# Patient Record
Sex: MALE | NOT HISPANIC OR LATINO | Employment: FULL TIME | URBAN - METROPOLITAN AREA
[De-identification: names, ages, dates, MRNs, and addresses within clinical notes are randomized per-mention and may not be internally consistent; named-entity substitution may affect disease eponyms.]

---

## 2020-12-11 ENCOUNTER — NURSE TRIAGE (OUTPATIENT)
Dept: OTHER | Facility: OTHER | Age: 30
End: 2020-12-11

## 2020-12-11 DIAGNOSIS — Z20.822 SUSPECTED COVID-19 VIRUS INFECTION: ICD-10-CM

## 2020-12-11 DIAGNOSIS — Z20.822 SUSPECTED COVID-19 VIRUS INFECTION: Primary | ICD-10-CM

## 2020-12-11 PROCEDURE — U0003 INFECTIOUS AGENT DETECTION BY NUCLEIC ACID (DNA OR RNA); SEVERE ACUTE RESPIRATORY SYNDROME CORONAVIRUS 2 (SARS-COV-2) (CORONAVIRUS DISEASE [COVID-19]), AMPLIFIED PROBE TECHNIQUE, MAKING USE OF HIGH THROUGHPUT TECHNOLOGIES AS DESCRIBED BY CMS-2020-01-R: HCPCS | Performed by: FAMILY MEDICINE

## 2020-12-12 ENCOUNTER — TELEPHONE (OUTPATIENT)
Dept: GASTROENTEROLOGY | Facility: CLINIC | Age: 30
End: 2020-12-12

## 2020-12-12 LAB — SARS-COV-2 RNA SPEC QL NAA+PROBE: DETECTED

## 2023-08-15 ENCOUNTER — OFFICE VISIT (OUTPATIENT)
Dept: URGENT CARE | Facility: CLINIC | Age: 33
End: 2023-08-15
Payer: COMMERCIAL

## 2023-08-15 VITALS
SYSTOLIC BLOOD PRESSURE: 121 MMHG | HEART RATE: 80 BPM | DIASTOLIC BLOOD PRESSURE: 74 MMHG | OXYGEN SATURATION: 97 % | HEIGHT: 74 IN | RESPIRATION RATE: 14 BRPM | BODY MASS INDEX: 29.52 KG/M2 | TEMPERATURE: 98.8 F | WEIGHT: 230 LBS

## 2023-08-15 DIAGNOSIS — Z78.9 PRESENCE OF SURGICAL INCISION: Primary | ICD-10-CM

## 2023-08-15 PROCEDURE — 99213 OFFICE O/P EST LOW 20 MIN: CPT

## 2023-08-15 NOTE — PROGRESS NOTES
[facility]        NAME: Carole Haynes is a 28 y.o. male  : 1990    MRN: [id number]  DATE: August 15, 2023  TIME: 5:46 PM    Assessment and Plan   Presence of surgical incision [Z78.9]  1. Presence of surgical incision          Jennifer Quach RN present as chaperone for examination. No sign of active skin infection or abscess formation from surgical incision. Symptoms could be related to irritation from grooming practices and advised to proceed with caution. Encouraged continued supportive measures and to monitor for signs of infection. Follow up with Surgeon as needed. Follow up with PCP in 3-5 days or proceed to emergency department for worsening symptoms. Patient verbalized understanding of instructions given. Patient Instructions     Patient Instructions       No sign of infection today  Monitor for signs of infection, including redness, swelling, purulent drainage, or fever/chills  Caution with trimming area   Follow up with PCP in 3-5 days. Proceed to  ER if symptoms worsen. Cellulitis   AMBULATORY CARE:   Cellulitis  is a skin infection caused by bacteria. Cellulitis is common and can become severe. Cellulitis usually appears on the lower legs. It can also appear on the arms, face, and other areas. Cellulitis develops when bacteria enter a crack or break in your skin, such as a scratch, bite, or cut. Common signs and symptoms:  Signs and symptoms usually appear on one side of your body. You may have any of the following:  • A fever    • A red, warm, swollen area on your skin    • Pain when the area is touched    • Red spots, bumps, or blisters    • Bumpy, raised skin that feels like an orange peel    Seek care immediately if:   • Your wound gets larger and more painful. • You feel a crackling under your skin when you touch it. • You have purple dots or bumps on your skin, or you see bleeding under your skin.     • You see red streaks coming from the infected area.    Call your doctor if:   • The red, warm, swollen area gets larger. • Your fever or pain does not go away or gets worse. • The area does not get smaller after 3 days of antibiotics. • You have questions or concerns about your condition or care. Treatment:  You should start to see improvement in 3 days. If your cellulitis is severe, you may need IV antibiotics in the hospital. If cellulitis is not treated, the infection can spread through your body and become life-threatening. You may need any of the following medicines:  • Antibiotics  help treat a bacterial infection. • Acetaminophen  decreases pain and fever. It is available without a doctor's order. Ask how much to take and how often to take it. Follow directions. Read the labels of all other medicines you are using to see if they also contain acetaminophen, or ask your doctor or pharmacist. Acetaminophen can cause liver damage if not taken correctly. • NSAIDs , such as ibuprofen, help decrease swelling, pain, and fever. This medicine is available with or without a doctor's order. NSAIDs can cause stomach bleeding or kidney problems in certain people. If you take blood thinner medicine, always ask your healthcare provider if NSAIDs are safe for you. Always read the medicine label and follow directions. • Take your medicine as directed. Contact your healthcare provider if you think your medicine is not helping or if you have side effects. Tell your provider if you are allergic to any medicine. Keep a list of the medicines, vitamins, and herbs you take. Include the amounts, and when and why you take them. Bring the list or the pill bottles to follow-up visits. Carry your medicine list with you in case of an emergency. Self-care:   • Wash the area with soap and water every day. Gently pat dry. Use bandages if directed by your healthcare provider. • Apply cream or ointment as directed. These help protect the area.  Most over-the-counter products, such as petroleum jelly, are good to use. Ask your healthcare provider about specific creams or ointments you should use. • Place a cool, damp cloth on the area. Use clean cloths and clean water. You can do this as often as you need to. Cool, damp cloths may help decrease pain. • Elevate the area above the level of your heart  as often as you can. This will help decrease swelling and pain. Prop the area on pillows or blankets to keep it elevated comfortably. Prevent cellulitis:   • Do not scratch bug bites or areas of injury. You increase your risk for cellulitis by scratching these areas. • Do not share personal items, such as towels, clothing, and razors. • Clean exercise equipment  with germ-killing  before and after you use it. • Treat athlete's foot. This can help prevent the spread of a bacterial skin infection. • Wash your hands often. Use soap and water. Wash your hands after you use the bathroom, change a child's diapers, or sneeze. Wash your hands before you prepare or eat food. Use lotion to prevent dry, cracked skin. Follow up with your doctor within 3 days, or as directed:  He or she will check if your cellulitis is getting better. Write down your questions so you remember to ask them during your visits. © Copyright [employer] 2022 Information is for End User's use only and may not be sold, redistributed or otherwise used for commercial purposes. The above information is an  only. It is not intended as medical advice for individual conditions or treatments. Talk to your doctor, nurse or pharmacist before following any medical regimen to see if it is safe and effective for you.           Chief Complaint     Chief Complaint   Patient presents with   • surgery     Variocele surgery on June 21, 2023; stitch did not fall out of site on left groin; slightly irritated (red and swollen) starting 2 weeks ago, and having some discharge starting last          History of Present Illness       70-year-old male with a past medical history significant for varicocele status postsurgical correction from left groin in June 2023 presents for complaints of wound check. Patient reports noticing some redness and swelling from groin surgical incision x2 weeks however last night with clear drainage. Denies pain. He reports no post-surgical complications and cleared from follow-up after 2-week appointment. He states some sutures remained intact and advised that they would self dissolve. He denies any fever, chills, flu-like symptoms, testicular pain, penile pain, or urinary symptoms. Admits to grooming and trimming pubic region. Review of Systems   Review of Systems   Constitutional: Negative for chills and fever. HENT: Negative for congestion, ear discharge, ear pain, rhinorrhea, sore throat, trouble swallowing and voice change. Eyes: Negative for discharge. Respiratory: Negative for cough and shortness of breath. Cardiovascular: Negative for chest pain. Gastrointestinal: Negative for abdominal pain, diarrhea, nausea and vomiting. Genitourinary: Negative for difficulty urinating, penile pain, penile swelling, scrotal swelling and testicular pain. Musculoskeletal: Negative for arthralgias and myalgias. Skin: Positive for color change. Current Medications     No current outpatient medications on file. Current Allergies     Allergies as of 08/15/2023   • (No Known Allergies)            The following portions of the patient's history were reviewed and updated as appropriate: allergies, current medications, past family history, past medical history, past social history, past surgical history and problem list.     History reviewed. No pertinent past medical history. History reviewed. No pertinent surgical history. History reviewed. No pertinent family history. Medications have been verified.         Objective /74   Pulse 80   Temp 98.8 °F (37.1 °C)   Resp 14   Ht 6' 2" (1.88 m)   Wt 104 kg (230 lb)   SpO2 97%   BMI 29.53 kg/m²   No LMP for male patient. Physical Exam     Physical Exam  Vitals and nursing note reviewed. Exam conducted with a chaperone present. Constitutional:       General: He is not in acute distress. Appearance: He is not toxic-appearing. HENT:      Head: Normocephalic. Nose: Nose normal.      Mouth/Throat:      Mouth: Mucous membranes are moist.      Pharynx: Oropharynx is clear. Eyes:      Conjunctiva/sclera: Conjunctivae normal.   Pulmonary:      Effort: Pulmonary effort is normal.   Genitourinary:      Skin:     General: Skin is warm and dry. Neurological:      Mental Status: He is alert and oriented to person, place, and time. Gait: Gait is intact.    Psychiatric:         Mood and Affect: Mood normal.         Behavior: Behavior normal.

## 2023-08-15 NOTE — PATIENT INSTRUCTIONS
No sign of infection today  Monitor for signs of infection, including redness, swelling, purulent drainage, or fever/chills  Caution with trimming area   Follow up with PCP in 3-5 days. Proceed to  ER if symptoms worsen. Cellulitis   AMBULATORY CARE:   Cellulitis  is a skin infection caused by bacteria. Cellulitis is common and can become severe. Cellulitis usually appears on the lower legs. It can also appear on the arms, face, and other areas. Cellulitis develops when bacteria enter a crack or break in your skin, such as a scratch, bite, or cut. Common signs and symptoms:  Signs and symptoms usually appear on one side of your body. You may have any of the following:  A fever    A red, warm, swollen area on your skin    Pain when the area is touched    Red spots, bumps, or blisters    Bumpy, raised skin that feels like an orange peel    Seek care immediately if:   Your wound gets larger and more painful. You feel a crackling under your skin when you touch it. You have purple dots or bumps on your skin, or you see bleeding under your skin. You see red streaks coming from the infected area. Call your doctor if:   The red, warm, swollen area gets larger. Your fever or pain does not go away or gets worse. The area does not get smaller after 3 days of antibiotics. You have questions or concerns about your condition or care. Treatment:  You should start to see improvement in 3 days. If your cellulitis is severe, you may need IV antibiotics in the hospital. If cellulitis is not treated, the infection can spread through your body and become life-threatening. You may need any of the following medicines:  Antibiotics  help treat a bacterial infection. Acetaminophen  decreases pain and fever. It is available without a doctor's order. Ask how much to take and how often to take it. Follow directions.  Read the labels of all other medicines you are using to see if they also contain acetaminophen, or ask your doctor or pharmacist. Acetaminophen can cause liver damage if not taken correctly. NSAIDs , such as ibuprofen, help decrease swelling, pain, and fever. This medicine is available with or without a doctor's order. NSAIDs can cause stomach bleeding or kidney problems in certain people. If you take blood thinner medicine, always ask your healthcare provider if NSAIDs are safe for you. Always read the medicine label and follow directions. Take your medicine as directed. Contact your healthcare provider if you think your medicine is not helping or if you have side effects. Tell your provider if you are allergic to any medicine. Keep a list of the medicines, vitamins, and herbs you take. Include the amounts, and when and why you take them. Bring the list or the pill bottles to follow-up visits. Carry your medicine list with you in case of an emergency. Self-care:   Wash the area with soap and water every day. Gently pat dry. Use bandages if directed by your healthcare provider. Apply cream or ointment as directed. These help protect the area. Most over-the-counter products, such as petroleum jelly, are good to use. Ask your healthcare provider about specific creams or ointments you should use. Place a cool, damp cloth on the area. Use clean cloths and clean water. You can do this as often as you need to. Cool, damp cloths may help decrease pain. Elevate the area above the level of your heart  as often as you can. This will help decrease swelling and pain. Prop the area on pillows or blankets to keep it elevated comfortably. Prevent cellulitis:   Do not scratch bug bites or areas of injury. You increase your risk for cellulitis by scratching these areas. Do not share personal items, such as towels, clothing, and razors. Clean exercise equipment  with germ-killing  before and after you use it. Treat athlete's foot.   This can help prevent the spread of a bacterial skin infection. Wash your hands often. Use soap and water. Wash your hands after you use the bathroom, change a child's diapers, or sneeze. Wash your hands before you prepare or eat food. Use lotion to prevent dry, cracked skin. Follow up with your doctor within 3 days, or as directed:  He or she will check if your cellulitis is getting better. Write down your questions so you remember to ask them during your visits. © Copyright [facility] 2022 Information is for End User's use only and may not be sold, redistributed or otherwise used for commercial purposes. The above information is an  only. It is not intended as medical advice for individual conditions or treatments. Talk to your doctor, nurse or pharmacist before following any medical regimen to see if it is safe and effective for you.

## 2023-10-15 ENCOUNTER — OFFICE VISIT (OUTPATIENT)
Dept: URGENT CARE | Facility: CLINIC | Age: 33
End: 2023-10-15
Payer: COMMERCIAL

## 2023-10-15 VITALS
BODY MASS INDEX: 28.23 KG/M2 | WEIGHT: 220 LBS | HEIGHT: 74 IN | DIASTOLIC BLOOD PRESSURE: 74 MMHG | OXYGEN SATURATION: 99 % | SYSTOLIC BLOOD PRESSURE: 130 MMHG | TEMPERATURE: 98 F | HEART RATE: 68 BPM | RESPIRATION RATE: 18 BRPM

## 2023-10-15 DIAGNOSIS — R07.9 CHEST PAIN, UNSPECIFIED TYPE: Primary | ICD-10-CM

## 2023-10-15 PROCEDURE — 99213 OFFICE O/P EST LOW 20 MIN: CPT | Performed by: PHYSICIAN ASSISTANT

## 2023-10-15 PROCEDURE — 93005 ELECTROCARDIOGRAM TRACING: CPT | Performed by: PHYSICIAN ASSISTANT

## 2023-10-15 NOTE — PROGRESS NOTES
[facility]        NAME: Regina Maurice is a 35 y.o. male  : 1990    MRN: [id number]  DATE: October 15, 2023  TIME: 2:57 PM    Assessment and Plan   Chest pain, unspecified type [R07.9]  1. Chest pain, unspecified type  Ambulatory referral to [facility]            Patient Instructions   Trial over-the-counter reflux medications. Monitor symptoms with shortness shortness go to ER if they worsen. Make an appoint with a PCP. Follow up with PCP in 3-5 days. Proceed to  ER if symptoms worsen. 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 1100 was having sporadic pains on/off with some flushing. No dizziness or SOB. Chest pain subsided about 1400. History of Present Illness       Patient is a 40-year-old male with no significant past medical history presents the office complaining of intermittent left-sided chest pain since this morning. Pain is rated 1-2 out of 10 and occurs every few minutes and lasts only a couple seconds described as poking below right chest muscles. Symptoms occur regardless of what he is doing. Reports he occasionally felt like his body got warm but denies episodes of diaphoresis, syncope, presyncope, vision changes, jaw pain, left arm pain, numbness and tingling, palpitations, trouble breathing, nausea, vomiting, abdominal pain, or rashes. At first he thought it might be some reflux which has had in the past but his wife wanted him to be evaluated. Patient admits to chewing tobacco but denies smoking, heavy alcohol use, or illicit drug use. Denies history of DVT/PE, hemoptysis, recent travel or immobilization, surgery, lower extremity swelling or calf pain. Patient states he has not had an episode since arriving to the urgent care approximately 45 minutes ago. Denies family history of sudden death or early cardiac disease.         Review of Systems   Review of Systems   Constitutional:  Negative for chills and fever. HENT:  Negative for congestion and sore throat. Respiratory:  Negative for cough and shortness of breath. Cardiovascular:  Positive for chest pain. Negative for palpitations. Gastrointestinal:  Negative for abdominal pain, diarrhea, nausea and vomiting. Skin:  Negative for rash. Neurological:  Negative for dizziness, light-headedness and headaches. Current Medications     No current outpatient medications on file. Current Allergies     Allergies as of 10/15/2023    (No Known Allergies)            The following portions of the patient's history were reviewed and updated as appropriate: allergies, current medications, past family history, past medical history, past social history, past surgical history and problem list.     History reviewed. No pertinent past medical history. Past Surgical History:   Procedure Laterality Date    VARIOCOCELE REPAIR         Family History   Problem Relation Age of Onset    No Known Problems Mother     No Known Problems Father     Cancer Paternal Grandmother          Medications have been verified. Objective   /74   Pulse 68   Temp 98 °F (36.7 °C)   Resp 18   Ht 6' 2" (1.88 m)   Wt 99.8 kg (220 lb)   SpO2 99%   BMI 28.25 kg/m²   No LMP for male patient. Physical Exam     Physical Exam  Vitals and nursing note reviewed. Constitutional:       Appearance: Normal appearance. He is well-developed. HENT:      Head: Normocephalic and atraumatic. Right Ear: Tympanic membrane, ear canal and external ear normal.      Left Ear: Tympanic membrane, ear canal and external ear normal.      Nose: Nose normal.      Mouth/Throat:      Pharynx: Uvula midline. Eyes:      General: Lids are normal.      Conjunctiva/sclera: Conjunctivae normal.      Pupils: Pupils are equal, round, and reactive to light.    Cardiovascular:      Rate and Rhythm: Normal rate and regular rhythm. Heart sounds: Normal heart sounds. No murmur heard. No friction rub. No gallop. Pulmonary:      Effort: Pulmonary effort is normal.      Breath sounds: Normal breath sounds. No stridor. No wheezing or rales. Abdominal:      General: Bowel sounds are normal.      Palpations: Abdomen is soft. Tenderness: There is no abdominal tenderness. Musculoskeletal:         General: Normal range of motion. Cervical back: Neck supple. Skin:     General: Skin is warm and dry. Capillary Refill: Capillary refill takes less than 2 seconds. Neurological:      Mental Status: He is alert. EKG: NSR. No SHERRY or T wave depression.

## 2023-10-16 LAB
ATRIAL RATE: 68 BPM
P AXIS: 52 DEGREES
PR INTERVAL: 148 MS
QRS AXIS: 72 DEGREES
QRSD INTERVAL: 108 MS
QT INTERVAL: 400 MS
QTC INTERVAL: 425 MS
T WAVE AXIS: 54 DEGREES
VENTRICULAR RATE: 68 BPM

## 2023-10-16 PROCEDURE — 93010 ELECTROCARDIOGRAM REPORT: CPT | Performed by: INTERNAL MEDICINE

## 2024-01-04 ENCOUNTER — OFFICE VISIT (OUTPATIENT)
Dept: FAMILY MEDICINE CLINIC | Facility: CLINIC | Age: 34
End: 2024-01-04
Payer: COMMERCIAL

## 2024-01-04 VITALS
HEART RATE: 68 BPM | SYSTOLIC BLOOD PRESSURE: 128 MMHG | WEIGHT: 225.8 LBS | DIASTOLIC BLOOD PRESSURE: 70 MMHG | BODY MASS INDEX: 28.98 KG/M2 | HEIGHT: 74 IN | OXYGEN SATURATION: 100 %

## 2024-01-04 DIAGNOSIS — Z00.00 ANNUAL PHYSICAL EXAM: Primary | ICD-10-CM

## 2024-01-04 DIAGNOSIS — R07.9 CHEST PAIN, UNSPECIFIED TYPE: ICD-10-CM

## 2024-01-04 DIAGNOSIS — R00.2 PALPITATIONS: ICD-10-CM

## 2024-01-04 PROCEDURE — 99214 OFFICE O/P EST MOD 30 MIN: CPT | Performed by: FAMILY MEDICINE

## 2024-01-04 PROCEDURE — 99385 PREV VISIT NEW AGE 18-39: CPT | Performed by: FAMILY MEDICINE

## 2024-01-04 RX ORDER — OMEPRAZOLE 20 MG/1
20 CAPSULE, DELAYED RELEASE ORAL DAILY
Qty: 30 CAPSULE | Refills: 3 | Status: SHIPPED | OUTPATIENT
Start: 2024-01-04

## 2024-01-04 NOTE — PROGRESS NOTES
Assessment/Plan:       1. Annual physical exam  Comments:  Reviewed age-appropriate health maintenance and preventive care    2. Chest pain, unspecified type  Comments:  Seems to be related to esophageal spasm or heartburn.  Check labs a Holter and start omeprazole  Orders:  -     Ambulatory referral to Family Practice  -     CBC and differential; Future  -     Comprehensive metabolic panel; Future  -     Lipid Panel With Direct LDL; Future  -     omeprazole (PriLOSEC) 20 mg delayed release capsule; Take 1 capsule (20 mg total) by mouth daily    3. Palpitations  Comments:  Check labs and a Holter monitor  Orders:  -     Lipid Panel With Direct LDL; Future  -     TSH, 3rd generation with Free T4 reflex; Future  -     Magnesium; Future  -     Holter monitor; Future; Expected date: 01/04/2024          Subjective:      Patient ID: Ty Rosa is a 33 y.o. male.    The patient is here for his initial visit.  He is a very pleasant 33-year-old man.  No specific complaints or concerns other than occasional chest tightness or pressure with palpitations.  This happened after eating steak 2 times in the past.  He denies overt heartburn.  He went to urgent care and was ruled out for any heart problem his EKG was normal.  He does not have any chest pain or family history of premature coronary artery disease.  He does not smoke or use drugs.  He eats healthy and exercises.  He is on no chronic medications.  The pain is nonexertional without shortness of breath.        The following portions of the patient's history were reviewed and updated as appropriate: allergies, current medications, past family history, past medical history, past social history, past surgical history, and problem list.    Review of Systems   Respiratory: Negative.     Cardiovascular: Negative.    Gastrointestinal: Negative.      Depression Screening and Follow-up Plan: Patient was screened for depression during today's encounter. They screened negative with  "a PHQ-2 score of 0.    Tobacco Cessation Counseling: The patient is sincerely urged to quit consumption of tobacco. He is not ready to quit tobacco.           Objective:      /70 (BP Location: Right arm, Patient Position: Sitting, Cuff Size: Large)   Pulse 68   Ht 6' 2\" (1.88 m)   Wt 102 kg (225 lb 12.8 oz)   SpO2 100%   BMI 28.99 kg/m²          Physical Exam  Vitals and nursing note reviewed.   Constitutional:       Appearance: Normal appearance.   HENT:      Head: Normocephalic and atraumatic.      Nose: Nose normal.      Mouth/Throat:      Mouth: Mucous membranes are moist.   Eyes:      Extraocular Movements: Extraocular movements intact.      Pupils: Pupils are equal, round, and reactive to light.   Cardiovascular:      Rate and Rhythm: Normal rate and regular rhythm.      Pulses: Normal pulses.   Pulmonary:      Effort: Pulmonary effort is normal.      Breath sounds: Normal breath sounds.   Abdominal:      General: Bowel sounds are normal.      Palpations: Abdomen is soft.   Musculoskeletal:      Cervical back: Normal range of motion.   Skin:     General: Skin is warm and dry.      Capillary Refill: Capillary refill takes less than 2 seconds.   Neurological:      General: No focal deficit present.      Mental Status: He is alert.   Psychiatric:         Mood and Affect: Mood normal.         "

## 2024-01-19 ENCOUNTER — TELEPHONE (OUTPATIENT)
Age: 34
End: 2024-01-19

## 2024-01-19 NOTE — TELEPHONE ENCOUNTER
Pt called because he needs holter monitor done and has received a call to schedule it yet.    Confirmed order is in the chart.    Advised pt he can try calling to schedule since he hasn't heard from them yet.    Gave him cardiology number [phone]  Also gave central scheduling [phone]

## 2024-02-06 ENCOUNTER — APPOINTMENT (OUTPATIENT)
Dept: LAB | Facility: CLINIC | Age: 34
End: 2024-02-06
Payer: COMMERCIAL

## 2024-02-06 DIAGNOSIS — R07.9 CHEST PAIN, UNSPECIFIED TYPE: ICD-10-CM

## 2024-02-06 DIAGNOSIS — R00.2 PALPITATIONS: ICD-10-CM

## 2024-02-06 LAB
ALBUMIN SERPL BCP-MCNC: 4.8 G/DL (ref 3.5–5)
ALP SERPL-CCNC: 48 U/L (ref 34–104)
ALT SERPL W P-5'-P-CCNC: 16 U/L (ref 7–52)
ANION GAP SERPL CALCULATED.3IONS-SCNC: 8 MMOL/L
AST SERPL W P-5'-P-CCNC: 15 U/L (ref 13–39)
BASOPHILS # BLD AUTO: 0.02 THOUSANDS/ÂΜL (ref 0–0.1)
BASOPHILS NFR BLD AUTO: 0 % (ref 0–1)
BILIRUB SERPL-MCNC: 0.91 MG/DL (ref 0.2–1)
BUN SERPL-MCNC: 19 MG/DL (ref 5–25)
CALCIUM SERPL-MCNC: 10.2 MG/DL (ref 8.4–10.2)
CHLORIDE SERPL-SCNC: 101 MMOL/L (ref 96–108)
CHOLEST SERPL-MCNC: 195 MG/DL
CO2 SERPL-SCNC: 30 MMOL/L (ref 21–32)
CREAT SERPL-MCNC: 1.22 MG/DL (ref 0.6–1.3)
EOSINOPHIL # BLD AUTO: 0.12 THOUSAND/ÂΜL (ref 0–0.61)
EOSINOPHIL NFR BLD AUTO: 2 % (ref 0–6)
ERYTHROCYTE [DISTWIDTH] IN BLOOD BY AUTOMATED COUNT: 12.1 % (ref 11.6–15.1)
GFR SERPL CREATININE-BSD FRML MDRD: 77 ML/MIN/1.73SQ M
GLUCOSE P FAST SERPL-MCNC: 101 MG/DL (ref 65–99)
HCT VFR BLD AUTO: 48.9 % (ref 36.5–49.3)
HDLC SERPL-MCNC: 40 MG/DL
HGB BLD-MCNC: 16.3 G/DL (ref 12–17)
IMM GRANULOCYTES # BLD AUTO: 0.03 THOUSAND/UL (ref 0–0.2)
IMM GRANULOCYTES NFR BLD AUTO: 0 % (ref 0–2)
LDLC SERPL CALC-MCNC: 129 MG/DL (ref 0–100)
LYMPHOCYTES # BLD AUTO: 3.3 THOUSANDS/ÂΜL (ref 0.6–4.47)
LYMPHOCYTES NFR BLD AUTO: 48 % (ref 14–44)
MAGNESIUM SERPL-MCNC: 2 MG/DL (ref 1.9–2.7)
MCH RBC QN AUTO: 28.9 PG (ref 26.8–34.3)
MCHC RBC AUTO-ENTMCNC: 33.3 G/DL (ref 31.4–37.4)
MCV RBC AUTO: 87 FL (ref 82–98)
MONOCYTES # BLD AUTO: 0.58 THOUSAND/ÂΜL (ref 0.17–1.22)
MONOCYTES NFR BLD AUTO: 9 % (ref 4–12)
NEUTROPHILS # BLD AUTO: 2.77 THOUSANDS/ÂΜL (ref 1.85–7.62)
NEUTS SEG NFR BLD AUTO: 41 % (ref 43–75)
NRBC BLD AUTO-RTO: 0 /100 WBCS
PLATELET # BLD AUTO: 294 THOUSANDS/UL (ref 149–390)
PMV BLD AUTO: 9.2 FL (ref 8.9–12.7)
POTASSIUM SERPL-SCNC: 4.4 MMOL/L (ref 3.5–5.3)
PROT SERPL-MCNC: 7.8 G/DL (ref 6.4–8.4)
RBC # BLD AUTO: 5.64 MILLION/UL (ref 3.88–5.62)
SODIUM SERPL-SCNC: 139 MMOL/L (ref 135–147)
TRIGL SERPL-MCNC: 132 MG/DL
TSH SERPL DL<=0.05 MIU/L-ACNC: 2.17 UIU/ML (ref 0.45–4.5)
WBC # BLD AUTO: 6.82 THOUSAND/UL (ref 4.31–10.16)

## 2024-02-06 PROCEDURE — 84443 ASSAY THYROID STIM HORMONE: CPT

## 2024-02-06 PROCEDURE — 80061 LIPID PANEL: CPT

## 2024-02-06 PROCEDURE — 80053 COMPREHEN METABOLIC PANEL: CPT

## 2024-02-06 PROCEDURE — 83735 ASSAY OF MAGNESIUM: CPT

## 2024-02-06 PROCEDURE — 36415 COLL VENOUS BLD VENIPUNCTURE: CPT

## 2024-02-06 PROCEDURE — 85025 COMPLETE CBC W/AUTO DIFF WBC: CPT

## 2024-02-07 ENCOUNTER — RA CDI HCC (OUTPATIENT)
Dept: OTHER | Facility: HOSPITAL | Age: 34
End: 2024-02-07

## 2024-02-07 ENCOUNTER — TELEPHONE (OUTPATIENT)
Dept: FAMILY MEDICINE CLINIC | Facility: CLINIC | Age: 34
End: 2024-02-07

## 2024-02-08 ENCOUNTER — OFFICE VISIT (OUTPATIENT)
Dept: FAMILY MEDICINE CLINIC | Facility: CLINIC | Age: 34
End: 2024-02-08
Payer: COMMERCIAL

## 2024-02-08 VITALS
BODY MASS INDEX: 28.39 KG/M2 | DIASTOLIC BLOOD PRESSURE: 72 MMHG | HEART RATE: 82 BPM | HEIGHT: 74 IN | OXYGEN SATURATION: 98 % | SYSTOLIC BLOOD PRESSURE: 122 MMHG | WEIGHT: 221.2 LBS

## 2024-02-08 DIAGNOSIS — K21.9 GASTROESOPHAGEAL REFLUX DISEASE WITHOUT ESOPHAGITIS: Primary | ICD-10-CM

## 2024-02-08 PROCEDURE — 99213 OFFICE O/P EST LOW 20 MIN: CPT | Performed by: FAMILY MEDICINE

## 2024-02-08 NOTE — PROGRESS NOTES
"Assessment/Plan:       1. Gastroesophageal reflux disease without esophagitis  Assessment & Plan:  Continue ppi  Wean off in 2 months            Subjective:      Patient ID: Ty Rosa is a 33 y.o. male.    Patient is here for follow-up he is feeling better no palpitations or chest pain since his last appointment while on the proton pump inhibitor.  They never called him to schedule the Holter monitor working to hold on that for now anyway if his symptoms return we will started up again.  He will take the omeprazole for another 2 months and then wean down to Pepcid and then off.        The following portions of the patient's history were reviewed and updated as appropriate: allergies, current medications, past family history, past medical history, past social history, past surgical history, and problem list.    Review of Systems   Respiratory: Negative.     Cardiovascular: Negative.          Objective:      /72 (BP Location: Left arm, Patient Position: Sitting, Cuff Size: Large)   Pulse 82   Ht 6' 2\" (1.88 m)   Wt 100 kg (221 lb 3.2 oz)   SpO2 98%   BMI 28.40 kg/m²          Physical Exam  Vitals and nursing note reviewed.   Constitutional:       Appearance: Normal appearance.   HENT:      Head: Normocephalic and atraumatic.      Nose: Nose normal.      Mouth/Throat:      Mouth: Mucous membranes are moist.   Eyes:      Extraocular Movements: Extraocular movements intact.      Pupils: Pupils are equal, round, and reactive to light.   Cardiovascular:      Rate and Rhythm: Normal rate and regular rhythm.      Pulses: Normal pulses.   Pulmonary:      Effort: Pulmonary effort is normal.      Breath sounds: Normal breath sounds.   Abdominal:      General: Bowel sounds are normal.      Palpations: Abdomen is soft.   Musculoskeletal:      Cervical back: Normal range of motion.   Skin:     General: Skin is warm and dry.      Capillary Refill: Capillary refill takes less than 2 seconds.   Neurological:      " General: No focal deficit present.      Mental Status: He is alert.   Psychiatric:         Mood and Affect: Mood normal.

## 2024-05-17 ENCOUNTER — OFFICE VISIT (OUTPATIENT)
Dept: FAMILY MEDICINE CLINIC | Facility: CLINIC | Age: 34
End: 2024-05-17
Payer: COMMERCIAL

## 2024-05-17 VITALS
SYSTOLIC BLOOD PRESSURE: 124 MMHG | WEIGHT: 220 LBS | BODY MASS INDEX: 28.23 KG/M2 | OXYGEN SATURATION: 99 % | HEIGHT: 74 IN | DIASTOLIC BLOOD PRESSURE: 66 MMHG | HEART RATE: 72 BPM

## 2024-05-17 DIAGNOSIS — K92.1 BLOODY STOOL: Primary | ICD-10-CM

## 2024-05-17 DIAGNOSIS — K64.4 EXTERNAL HEMORRHOIDS: ICD-10-CM

## 2024-05-17 DIAGNOSIS — R63.4 WEIGHT LOSS: ICD-10-CM

## 2024-05-17 PROCEDURE — 99214 OFFICE O/P EST MOD 30 MIN: CPT | Performed by: FAMILY MEDICINE

## 2024-05-17 NOTE — PROGRESS NOTES
"Assessment/Plan:       1. Bloody stool  Comments:  Refer to specialist for colonoscopy  Check labs  Check CAT scan  Orders:  -     Ambulatory Referral to Colorectal Surgery; Future  -     CT abdomen pelvis w wo contrast; Future; Expected date: 05/17/2024  2. Weight loss  Comments:  Check labs and CAT scan  Orders:  -     Ambulatory Referral to Colorectal Surgery; Future  -     CT abdomen pelvis w wo contrast; Future; Expected date: 05/17/2024  3. External hemorrhoids  Comments:  Refer to colon specialist  Orders:  -     Ambulatory Referral to Colorectal Surgery; Future  -     CT abdomen pelvis w wo contrast; Future; Expected date: 05/17/2024        Subjective:      Patient ID: Ty Rosa is a 33 y.o. male.    The patient has migratory cramping abdominal discomfort for a week.  He had a very hard bowel movement yesterday with some bright red blood in the toilet.  This has happened before.  He is mildly constipated.  We discussed preventing constipation increasing fluids and fiber considering a probiotic or Metamucil.  He also has 10 to 12 pounds of unexplained weight loss over the past 6 months.  No family history of colon cancer or inflammatory bowel disease.  He has no abdominal pain currently.        The following portions of the patient's history were reviewed and updated as appropriate: allergies, current medications, past family history, past medical history, past social history, past surgical history, and problem list.    Review of Systems   Respiratory: Negative.     Cardiovascular: Negative.          Objective:      /66 (BP Location: Right arm, Patient Position: Sitting, Cuff Size: Large)   Pulse 72   Ht 6' 2\" (1.88 m)   Wt 99.8 kg (220 lb)   SpO2 99%   BMI 28.25 kg/m²          Physical Exam  Vitals and nursing note reviewed.   Constitutional:       Appearance: Normal appearance.   HENT:      Head: Normocephalic and atraumatic.      Nose: Nose normal.      Mouth/Throat:      Mouth: Mucous " membranes are moist.   Eyes:      Extraocular Movements: Extraocular movements intact.      Pupils: Pupils are equal, round, and reactive to light.   Cardiovascular:      Rate and Rhythm: Normal rate and regular rhythm.      Pulses: Normal pulses.   Pulmonary:      Effort: Pulmonary effort is normal.      Breath sounds: Normal breath sounds.   Abdominal:      General: Bowel sounds are normal.      Palpations: Abdomen is soft.   Genitourinary:     Comments: No external hemorrhoid  Musculoskeletal:      Cervical back: Normal range of motion.   Skin:     General: Skin is warm and dry.      Capillary Refill: Capillary refill takes less than 2 seconds.   Neurological:      General: No focal deficit present.      Mental Status: He is alert.   Psychiatric:         Mood and Affect: Mood normal.

## 2024-05-28 ENCOUNTER — TELEPHONE (OUTPATIENT)
Age: 34
End: 2024-05-28

## 2024-05-29 NOTE — TELEPHONE ENCOUNTER
Patient has started drinking the barium for his CT. Asking for Dr to call and expedite the PA so he can have this done today.     Please call [facility]     [phone]

## 2024-05-29 NOTE — TELEPHONE ENCOUNTER
Patient has started drinking the barium solution. Denise from [employer]  is still checking on the status of the auth for abd pelvis CT.

## 2024-05-29 NOTE — TELEPHONE ENCOUNTER
Spoke to patient. Prior auth is still pending clinical review. Patient will reschedule testing from todays date

## 2024-06-24 ENCOUNTER — HOSPITAL ENCOUNTER (OUTPATIENT)
Dept: CT IMAGING | Facility: HOSPITAL | Age: 34
Discharge: HOME | End: 2024-06-24
Attending: FAMILY MEDICINE
Payer: COMMERCIAL

## 2024-06-24 DIAGNOSIS — K92.1 BLOODY STOOL: ICD-10-CM

## 2024-06-24 DIAGNOSIS — R63.4 WEIGHT LOSS: ICD-10-CM

## 2024-06-24 DIAGNOSIS — K64.4 EXTERNAL HEMORRHOIDS: ICD-10-CM

## 2024-06-24 PROCEDURE — 74177 CT ABD & PELVIS W/CONTRAST: CPT

## 2024-06-24 RX ADMIN — IOHEXOL 100 ML: 350 INJECTION, SOLUTION INTRAVENOUS at 16:45

## 2024-06-30 ENCOUNTER — TELEPHONE (OUTPATIENT)
Dept: FAMILY MEDICINE CLINIC | Facility: CLINIC | Age: 34
End: 2024-06-30

## 2024-06-30 NOTE — TELEPHONE ENCOUNTER
----- Message from Robert Budinetz, MD sent at 6/29/2024  1:36 PM EDT -----  No significant finding  Possible resolving colitis  Please ensure he is  seeing a specialist and will have a colonoscopy

## 2024-07-06 ENCOUNTER — OFFICE VISIT (OUTPATIENT)
Dept: URGENT CARE | Facility: CLINIC | Age: 34
End: 2024-07-06
Payer: COMMERCIAL

## 2024-07-06 VITALS
HEIGHT: 74 IN | RESPIRATION RATE: 16 BRPM | HEART RATE: 71 BPM | BODY MASS INDEX: 28.23 KG/M2 | WEIGHT: 220 LBS | TEMPERATURE: 97.1 F | DIASTOLIC BLOOD PRESSURE: 68 MMHG | SYSTOLIC BLOOD PRESSURE: 130 MMHG | OXYGEN SATURATION: 97 %

## 2024-07-06 DIAGNOSIS — R19.5 CHANGE IN STOOL: Primary | ICD-10-CM

## 2024-07-06 PROCEDURE — S9083 URGENT CARE CENTER GLOBAL: HCPCS | Performed by: PHYSICIAN ASSISTANT

## 2024-07-06 PROCEDURE — G0382 LEV 3 HOSP TYPE B ED VISIT: HCPCS | Performed by: PHYSICIAN ASSISTANT

## 2024-07-06 NOTE — PROGRESS NOTES
[facility]        NAME: Ty Rosa is a 33 y.o. male  : 1990    MRN: [id number]  DATE: 2024  TIME: 3:07 PM    Assessment and Plan   Change in stool [R19.5]  1. Change in stool              Patient Instructions       Follow up with GI as planned.  Reassurance.  If tests have been performed at [facility], our office will contact you with results if changes need to be made to the care plan discussed with you at the visit.  [facility]    Chief Complaint     Chief Complaint   Patient presents with    Abdominal Pain     Has been going on for about 2 months; had a ct scan done-nothing found  Has noticed changes in stool color in the past week;   Upper abdomin pain no difference if its after he eats; no nausea;has diarrhea; no changes in diet          History of Present Illness       Patient comes in today because his stools changed color to yellowish.  He has a history of abdominal pain and had a CT and recently barium.  He denies any food changes though he did have more alcohol over the last few days.  He denies any diarrhea or stool changes otherwise.  No pain.  He is scheduled for colonoscopy in 2 weeks.  There is no change in the texture of his stool.  Nothing aggravated or alleviated the color, he was just concerned whether was infectious.  No fever.  He has not been out of the country.    Abdominal Pain  This is a chronic problem. The current episode started more than 1 month ago. The problem has been unchanged.       Review of Systems   Review of Systems   Gastrointestinal:  Positive for abdominal pain.   All other systems reviewed and are negative.        Current Medications       Current Outpatient Medications:     omeprazole (PriLOSEC) 20 mg delayed release capsule, Take 1 capsule (20 mg total) by mouth daily, Disp: 30 capsule, Rfl: 3    Current Allergies     Allergies as of 2024    (No Known Allergies)            The following portions  "of the patient's history were reviewed and updated as appropriate: allergies, current medications, past family history, past medical history, past social history, past surgical history and problem list.     History reviewed. No pertinent past medical history.    Past Surgical History:   Procedure Laterality Date    VARIOCOCELE REPAIR         Family History   Problem Relation Age of Onset    No Known Problems Mother     No Known Problems Father     Cancer Paternal Grandmother          Medications have been verified.        Objective   /68   Pulse 71   Temp (!) 97.1 °F (36.2 °C)   Resp 16   Ht 6' 2\" (1.88 m)   Wt 99.8 kg (220 lb)   SpO2 97%   BMI 28.25 kg/m²   No LMP for male patient.       Physical Exam     Physical Exam  Vitals and nursing note reviewed.   Constitutional:       Appearance: He is well-developed and normal weight.   Cardiovascular:      Rate and Rhythm: Normal rate and regular rhythm.      Pulses: Normal pulses.      Heart sounds: Normal heart sounds.   Pulmonary:      Effort: Pulmonary effort is normal.      Breath sounds: Normal breath sounds.   Abdominal:      General: Abdomen is flat. Bowel sounds are normal.      Palpations: Abdomen is soft.      Tenderness: There is no abdominal tenderness.   Neurological:      Mental Status: He is alert.                   "

## 2024-07-14 ENCOUNTER — APPOINTMENT (OUTPATIENT)
Dept: RADIOLOGY | Facility: CLINIC | Age: 34
End: 2024-07-14
Payer: COMMERCIAL

## 2024-07-14 ENCOUNTER — OFFICE VISIT (OUTPATIENT)
Dept: URGENT CARE | Facility: CLINIC | Age: 34
End: 2024-07-14
Payer: COMMERCIAL

## 2024-07-14 VITALS
RESPIRATION RATE: 16 BRPM | WEIGHT: 220 LBS | OXYGEN SATURATION: 100 % | BODY MASS INDEX: 28.23 KG/M2 | SYSTOLIC BLOOD PRESSURE: 147 MMHG | DIASTOLIC BLOOD PRESSURE: 73 MMHG | HEART RATE: 83 BPM | HEIGHT: 74 IN | TEMPERATURE: 97.5 F

## 2024-07-14 DIAGNOSIS — M79.675 GREAT TOE PAIN, LEFT: Primary | ICD-10-CM

## 2024-07-14 DIAGNOSIS — M79.675 GREAT TOE PAIN, LEFT: ICD-10-CM

## 2024-07-14 DIAGNOSIS — M10.072 ACUTE IDIOPATHIC GOUT INVOLVING TOE OF LEFT FOOT: ICD-10-CM

## 2024-07-14 PROCEDURE — G0382 LEV 3 HOSP TYPE B ED VISIT: HCPCS

## 2024-07-14 PROCEDURE — S9083 URGENT CARE CENTER GLOBAL: HCPCS

## 2024-07-14 PROCEDURE — 73620 X-RAY EXAM OF FOOT: CPT

## 2024-07-14 RX ORDER — PREDNISONE 10 MG/1
TABLET ORAL
Qty: 21 TABLET | Refills: 0 | Status: SHIPPED | OUTPATIENT
Start: 2024-07-14

## 2024-07-14 RX ORDER — NAPROXEN 500 MG/1
500 TABLET ORAL 2 TIMES DAILY WITH MEALS
Qty: 20 TABLET | Refills: 0 | Status: SHIPPED | OUTPATIENT
Start: 2024-07-14

## 2024-07-14 RX ORDER — IBUPROFEN 600 MG/1
TABLET ORAL EVERY 6 HOURS PRN
COMMUNITY

## 2024-07-14 NOTE — PATIENT INSTRUCTIONS
Take the prednisone with food in the morning  Apply ice as tolerated.  Wear a good supportive shoe.  Take the naproxen with food and as needed for pain

## 2024-07-14 NOTE — PROGRESS NOTES
[facility]        NAME: Ty Rosa is a 33 y.o. male  : 1990    MRN: [id number]  DATE: 2024  TIME: 10:22 AM    Assessment and Plan   Great toe pain, left [M79.675]  1. Great toe pain, left  XR foot 2 vw left      2. Acute idiopathic gout involving toe of left foot  predniSONE 10 mg tablet    naproxen (Naprosyn) 500 mg tablet        X-ray interpreted by myself. No acute osseous abnormality. Pending radiology review.    Patient Instructions     Take the prednisone with food in the morning  Apply ice as tolerated.  Wear a good supportive shoe.  Take the naproxen with food and as needed for pain  Follow up with PCP in 3-5 days.  Proceed to  ER if symptoms worsen.    If tests are performed, our office will contact you with results only if changes need to made to the care plan discussed with you at the visit. [facility]    Chief Complaint     Chief Complaint   Patient presents with    Toe Pain     Started Thursday night; woke up with severe pain in left big toe         History of Present Illness       Patient is a 33YOM presenting with left great toe pain since Thursday. He denies any injury. He states he woke up with pain in his left great toe. He states it was painful to even have the sheet touch.     Toe Pain         Review of Systems   Review of Systems   Constitutional:  Negative for chills and fever.   Musculoskeletal:  Positive for arthralgias and joint swelling.   All other systems reviewed and are negative.        Current Medications       Current Outpatient Medications:     ibuprofen (MOTRIN) 600 mg tablet, Take by mouth every 6 (six) hours as needed for mild pain, Disp: , Rfl:     naproxen (Naprosyn) 500 mg tablet, Take 1 tablet (500 mg total) by mouth 2 (two) times a day with meals, Disp: 20 tablet, Rfl: 0    omeprazole (PriLOSEC) 20 mg delayed release capsule, Take 1 capsule (20 mg total) by mouth daily, Disp: 30 capsule, Rfl: 3     "predniSONE 10 mg tablet, Take 6 pills day 1, then 5 pills day 2, 4 pills day 3, 3 pills day 4, 2 pills day 5, 1 pill day 6,, Disp: 21 tablet, Rfl: 0    Current Allergies     Allergies as of 07/14/2024    (No Known Allergies)            The following portions of the patient's history were reviewed and updated as appropriate: allergies, current medications, past family history, past medical history, past social history, past surgical history and problem list.     Past Medical History:   Diagnosis Date    Heartburn        Past Surgical History:   Procedure Laterality Date    VARIOCOCELE REPAIR         Family History   Problem Relation Age of Onset    No Known Problems Mother     Gout Father     Cancer Paternal Grandmother          Medications have been verified.        Objective   /73   Pulse 83   Temp 97.5 °F (36.4 °C)   Resp 16   Ht 6' 2\" (1.88 m)   Wt 99.8 kg (220 lb)   SpO2 100%   BMI 28.25 kg/m²        Physical Exam     Physical Exam  Vitals and nursing note reviewed.   Constitutional:       General: He is not in acute distress.     Appearance: Normal appearance. He is normal weight. He is not ill-appearing or toxic-appearing.   Cardiovascular:      Rate and Rhythm: Normal rate.   Pulmonary:      Effort: Pulmonary effort is normal.   Musculoskeletal:      Left foot: Normal range of motion and normal capillary refill. Swelling and tenderness (over the left great toe, redness and minor swelling noted) present. Normal pulse.   Neurological:      Mental Status: He is alert.                   "

## 2025-02-11 ENCOUNTER — RA CDI HCC (OUTPATIENT)
Dept: OTHER | Facility: HOSPITAL | Age: 35
End: 2025-02-11

## 2025-02-13 ENCOUNTER — OFFICE VISIT (OUTPATIENT)
Dept: FAMILY MEDICINE CLINIC | Facility: CLINIC | Age: 35
End: 2025-02-13
Payer: COMMERCIAL

## 2025-02-13 VITALS
HEIGHT: 74 IN | OXYGEN SATURATION: 99 % | BODY MASS INDEX: 29.18 KG/M2 | WEIGHT: 227.4 LBS | SYSTOLIC BLOOD PRESSURE: 122 MMHG | HEART RATE: 78 BPM | DIASTOLIC BLOOD PRESSURE: 78 MMHG

## 2025-02-13 DIAGNOSIS — Z00.00 ANNUAL PHYSICAL EXAM: Primary | ICD-10-CM

## 2025-02-13 DIAGNOSIS — Z23 ENCOUNTER FOR IMMUNIZATION: ICD-10-CM

## 2025-02-13 PROCEDURE — 90471 IMMUNIZATION ADMIN: CPT

## 2025-02-13 PROCEDURE — 99395 PREV VISIT EST AGE 18-39: CPT | Performed by: FAMILY MEDICINE

## 2025-02-13 PROCEDURE — 90715 TDAP VACCINE 7 YRS/> IM: CPT

## 2025-02-13 NOTE — PROGRESS NOTES
"Name: Ty Rosa      : 1990      MRN: [id number]  Encounter Provider: Robert Budinetz, MD  Encounter Date: 2025   Encounter department: [facility]  :  Assessment & Plan  Annual physical exam  Reviewed age-appropriate health maintenance and preventive care.                 Depression Screening and Follow-up Plan: Patient was screened for depression during today's encounter. They screened negative with a PHQ-2 score of 0.      History of Present Illness   The patient is doing well without any complaints or concerns.  He has a 1-week-old infant at home he needs a Tdap.  He understands importance of healthy diet exercise and weight loss.  He had normal blood work last year working to do it again next year.  He does not smoke.  He is not due for any other screening tests at this time his health is good.    Review of Systems   Respiratory: Negative.     Cardiovascular: Negative.    Gastrointestinal: Negative.        Objective   /78 (BP Location: Left arm, Patient Position: Sitting, Cuff Size: Large)   Pulse 78   Ht 6' 2\" (1.88 m)   Wt 103 kg (227 lb 6.4 oz)   SpO2 99%   BMI 29.20 kg/m²      Physical Exam  Vitals and nursing note reviewed.   Constitutional:       General: He is not in acute distress.     Appearance: He is well-developed.   HENT:      Head: Normocephalic and atraumatic.   Eyes:      Conjunctiva/sclera: Conjunctivae normal.   Cardiovascular:      Rate and Rhythm: Normal rate and regular rhythm.      Heart sounds: No murmur heard.  Pulmonary:      Effort: Pulmonary effort is normal. No respiratory distress.      Breath sounds: Normal breath sounds.   Abdominal:      Palpations: Abdomen is soft.      Tenderness: There is no abdominal tenderness.   Musculoskeletal:         General: No swelling.      Cervical back: Neck supple.   Skin:     General: Skin is warm and dry.      Capillary Refill: Capillary refill takes less than 2 seconds.   Neurological:      Mental " Status: He is alert.   Psychiatric:         Mood and Affect: Mood normal.

## 2025-08-03 ENCOUNTER — OFFICE VISIT (OUTPATIENT)
Dept: URGENT CARE | Facility: CLINIC | Age: 35
End: 2025-08-03
Payer: COMMERCIAL

## 2025-08-03 VITALS
BODY MASS INDEX: 30.42 KG/M2 | RESPIRATION RATE: 20 BRPM | OXYGEN SATURATION: 96 % | HEIGHT: 74 IN | TEMPERATURE: 96.9 F | HEART RATE: 71 BPM | DIASTOLIC BLOOD PRESSURE: 80 MMHG | SYSTOLIC BLOOD PRESSURE: 119 MMHG | WEIGHT: 237 LBS

## 2025-08-03 DIAGNOSIS — M10.9 ACUTE GOUT INVOLVING TOE OF LEFT FOOT, UNSPECIFIED CAUSE: Primary | ICD-10-CM

## 2025-08-03 PROCEDURE — G0382 LEV 3 HOSP TYPE B ED VISIT: HCPCS | Performed by: PHYSICIAN ASSISTANT

## 2025-08-03 PROCEDURE — S9083 URGENT CARE CENTER GLOBAL: HCPCS | Performed by: PHYSICIAN ASSISTANT

## 2025-08-03 RX ORDER — PREDNISONE 10 MG/1
TABLET ORAL
Qty: 21 TABLET | Refills: 0 | Status: SHIPPED | OUTPATIENT
Start: 2025-08-03

## 2025-08-03 RX ORDER — OMEPRAZOLE 20 MG/1
20 TABLET, DELAYED RELEASE ORAL DAILY
COMMUNITY